# Patient Record
(demographics unavailable — no encounter records)

---

## 2025-04-07 NOTE — CONSULT LETTER
[Dear  ___] : Dear  [unfilled], [Consult Letter:] : I had the pleasure of evaluating your patient, [unfilled]. [Please see my note below.] : Please see my note below. [Consult Closing:] : Thank you very much for allowing me to participate in the care of this patient.  If you have any questions, please do not hesitate to contact me. [FreeTextEntry3] : Sincerely,\par  \par  Rosalinda Henriquez MD\par  [facility] Physician [facility]\par  Pulmonary Medicine\par  tel: [phone]\par  fax: [phone]\par

## 2025-04-07 NOTE — COUNSELING
[Yes] : Risk of tobacco use and health benefits of smoking cessation discussed: Yes [Cessation strategies including cessation program discussed] : Cessation strategies including cessation program discussed [Use of nicotine replacement therapies and other medications discussed] : Use of nicotine replacement therapies and other medications discussed [No] : Not willing to quit smoking [ - Annual Lung Cancer Screening/Share Decision Making Discussion] : Annual Lung Cancer Screening/Share Decision Making Discussion. (I have advised this patient to have a Low Dose CT (LDCT) scan of the lungs and have discussed the following with the patient in a shared decision making discussion:   Benefits of Detection and Early Treatment: There is adequate evidence that annual screening for lung cancer with LDCT in a population of high-risk persons can prevent a substantial number of lung cancer-related deaths. The magnitude of benefit depends on the individual patient's risk for lung cancer, as those who are at highest risk are most likely to benefit. Screening cannot prevent most lung cancer-related deaths, and does not replace smoking cessation. Harms of Detection and Early Intervention and Treatment: The harms associated with LDCT screening include false-negative and false-positive results, incidental findings, over diagnosis, and radiation exposure. False-positive LDCT results occur in a substantial proportion of screened persons; 95% of all positive results do not lead to a diagnosis of cancer. In a high-quality screening program, further imaging can resolve most false-positive results; however, some patients may require invasive procedures. Radiation harms, including cancer resulting from cumulative exposure to radiation, vary depending on the age at the start of screening; the number of scans received; and the person's exposure to other sources of radiation, particularly other medical imaging.) [FreeTextEntry1] : 3

## 2025-04-07 NOTE — ASSESSMENT
[FreeTextEntry1] : Ms. SINA CASTILLO is a 73 year old woman with COPD/emphysema is here for f/u.   Jimmy Castillo is also my patient.  #Asthma/COPD -diffuse wheezing on exam today PFTs 5/2024 with  FEV1/FVC =   55%; FEV1 = 1.49L; (68%, improved from prior), air trapping, normal DLCO. -- c/w Amna, recommend using with spacer -- standing Albuterol nebs 3-4 times/day -- Treat with a course of steroids   #Current smoker - continued negative effects of smoking discussed.  Patient not interested in quitting -- LDCT August 2024 -stable, repeat CT chest Aug 2025 -ordered  #HCM -   Recommend pneumococcal, RSV and COVID vaccines  All questions answered. Patient in agreement with plan.  Follow up in 6--8w or sooner if needed.

## 2025-04-07 NOTE — PHYSICAL EXAM
[No Acute Distress] : no acute distress [Normal Oropharynx] : normal oropharynx [Normal Appearance] : normal appearance [No Neck Mass] : no neck mass [Normal Rate/Rhythm] : normal rate/rhythm [Normal S1, S2] : normal s1, s2 [No Murmurs] : no murmurs [No Resp Distress] : no resp distress [No Abnormalities] : no abnormalities [Benign] : benign [Normal Gait] : normal gait [No Clubbing] : no clubbing [No Cyanosis] : no cyanosis [No Edema] : no edema [FROM] : FROM [Normal Color/ Pigmentation] : normal color/ pigmentation [No Focal Deficits] : no focal deficits [Oriented x3] : oriented x3 [Normal Affect] : normal affect [TextBox_68] : Decreased air movement, wheezing

## 2025-04-07 NOTE — HISTORY OF PRESENT ILLNESS
[Current] : current [>= 20 pack years] : >= 20 pack years [Never] : never [TextBox_4] : Ms. SINA CASTILLO is a 72 year old current smoker with emphysema and COPD is here for follow-up.  Jimmy Castillo is also my patient  5/2024: Still smoking.  Not interested in quitting. Doing much better on Breztri.  Still notes occasional wheezing, has chronic cough due to smoking.  Reports her exercise tolerance is stable.  Gets winded carrying heavy things Treated with a course of Prednisone in April 2024 with improvmeent in sx  4/2025:  Was last seen almost a year ago.  Here for follow-up today.  Denies recent steroid use, was treated with a course of azithromycin for upper respiratory infection.  Over the last several weeks, notes worsening respiratory symptoms with cough and wheezing.  Continues to smoke about 7 cigs/day  ROS:  abd cramping, constipation denies fevers, chills, night sweats, unintentional weight loss denies known autoimmune disease  PMH: IBS-C; COPD Meds: per chart All: Penicillin SH:  smoker; about 5 cigs/day; smoked about 1ppd, started at age 13 FH: Denies family hx of pulmonary or autoimmune disease PMD: Dr Street Immunizations:  [TextBox_11] : 0.25 [TextBox_13] : 13